# Patient Record
Sex: MALE | Race: WHITE | NOT HISPANIC OR LATINO | URBAN - METROPOLITAN AREA
[De-identification: names, ages, dates, MRNs, and addresses within clinical notes are randomized per-mention and may not be internally consistent; named-entity substitution may affect disease eponyms.]

---

## 2017-05-16 PROBLEM — Z00.00 ENCOUNTER FOR PREVENTIVE HEALTH EXAMINATION: Status: ACTIVE | Noted: 2017-05-16

## 2017-09-28 ENCOUNTER — EMERGENCY (EMERGENCY)
Facility: HOSPITAL | Age: 52
LOS: 1 days | Discharge: HOME | End: 2017-09-28
Attending: EMERGENCY MEDICINE | Admitting: EMERGENCY MEDICINE
Payer: COMMERCIAL

## 2017-09-28 VITALS
HEART RATE: 71 BPM | SYSTOLIC BLOOD PRESSURE: 163 MMHG | RESPIRATION RATE: 20 BRPM | TEMPERATURE: 98 F | OXYGEN SATURATION: 100 % | DIASTOLIC BLOOD PRESSURE: 100 MMHG

## 2017-09-28 PROCEDURE — 99284 EMERGENCY DEPT VISIT MOD MDM: CPT

## 2017-09-28 NOTE — CONSULT NOTE ADULT - SUBJECTIVE AND OBJECTIVE BOX
[facility] Ophthalmology Consult Note      CC: right eye vision change      HPI: 52 year old male, denies PMH, presented to the ED with one day of right eye vision change. Patient reports yesterday seeing floaters in his inferior view, progressing to inferior loss of vision OD. Denies any recent trauma. Denies flashes. Denies pain or redness.         PMH: Denies medical history including DM and HTN  POcHx (including surgeries/lasers):  None, reports history of cornea abrasion s/p work injury many years ago, now improved.   FamHx: Denies   Medications (systemic and drops): Denies use of medications  Allergies: PCN    ROS:  General (neg), Vision (per HPI), Head and Neck (neg), Pulm (neg), CV (neg), GI (neg),  (neg), Musculoskeletal (neg), Skin/Integ (neg), Neuro (neg), Endocrine (neg), Heme (neg), All/Immuno (neg)    Mood and Affect Appropriate ( x ),  Oriented to Time, Place, and Person x 3 ( x )    Ophthalmology Exam    Visual acuity (cc): 20/400   OD,  20/20  OS, PH: NI OU  Pupils: R&R OU, no APD  Extraocular movements (EOMs): Full OU  Confrontational Visual Field (CVF):  Inferior hemifield defect OD, Full OS  Ttono: 12    OD, 12    OS      Pen Light Exam (PLE)  External:  WNL  Lids/Lashes/Lacrimal Ducts:  WNL  Sclera/Conjunctiva:  W&Q OU  Cornea: Clear OU  Anterior Chamber: D&F OU  Iris:  Flat OU  Lens:  Clear OU    Fundus Exam  (dilated with 1% tropicamide and 2.5% phenylephrine @  7PM       )  Patient aware that pupils can remained dilated for at least 4-6 hours  Exam performed with 20D lens    ~ ~DFE PENDING DILATION.....    Vitreous:   Cup/Disc:   Macula:    Vessels:    Periphery:     A/P: Pending full dilated exam.... ~~~        Follow-Up:  Patient should follow up in the [facility] on Friday Sept 29th at 9am.   [address] [place] [id number]  [phone]       Patient reports previously following with Dr. BLANCA Galarza in [place] [phone] and may follow up with his physician if he can assure to receive care on Monday. [facility] Ophthalmology Consult Note      CC: right eye vision change      HPI: 52 year old male, denies PMH, presented to the ED with one day of right eye vision change. Patient reports yesterday seeing floaters in his inferior view, progressing to inferior loss of vision OD. Denies any recent trauma. Denies flashes. Denies pain or redness.         PMH: Denies medical history including DM and HTN  POcHx (including surgeries/lasers):  None, reports history of cornea abrasion s/p work injury many years ago, now improved.   FamHx: Denies   Medications (systemic and drops): Denies use of medications  Allergies: PCN    ROS:  General (neg), Vision (per HPI), Head and Neck (neg), Pulm (neg), CV (neg), GI (neg),  (neg), Musculoskeletal (neg), Skin/Integ (neg), Neuro (neg), Endocrine (neg), Heme (neg), All/Immuno (neg)    Mood and Affect Appropriate ( x ),  Oriented to Time, Place, and Person x 3 ( x )    Ophthalmology Exam    Visual acuity (cc): 20/800   OD,  20/20  OS, PH: NI OU  Pupils: R&R OU, no APD  Extraocular movements (EOMs): Full OU  Confrontational Visual Field (CVF):  Inferior hemifield defect OD, Full OS  Ttono: 12    OD, 12    OS      Pen Light Exam (PLE)  External:  WNL  Lids/Lashes/Lacrimal Ducts:  WNL  Sclera/Conjunctiva:  W&Q OU  Cornea: Clear OU  Anterior Chamber: D&F OU  Iris:  Flat OU  Lens:  Clear OU    Fundus Exam  (dilated with 1% tropicamide and 2.5% phenylephrine @  7PM )  Patient aware that pupils can remained dilated for at least 4-6 hours  Exam performed with 20D lens    OD: [address], no view of intact macula behind detached retina  OS: c/d 0.4, M/V/P wnl     A/P: Retinal Detachment OD, 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 BLANCA Galarza in [place] [phone] and may follow up with his physician if he can assure to receive care on Monday. [facility] Ophthalmology Consult Note      CC: right eye vision change      HPI: 52 year old male, denies PMH, presented to the ED with one day of right eye vision change. Patient reports yesterday seeing floaters in his inferior view, progressing to inferior loss of vision OD. Denies any recent trauma. Denies flashes. Denies pain or redness.         PMH: Denies medical history including DM and HTN  POcHx (including surgeries/lasers):  None, reports history of cornea abrasion s/p work injury many years ago, now improved.   FamHx: Denies   Medications (systemic and drops): Denies use of medications  Allergies: PCN    ROS:  General (neg), Vision (per HPI), Head and Neck (neg), Pulm (neg), CV (neg), GI (neg),  (neg), Musculoskeletal (neg), Skin/Integ (neg), Neuro (neg), Endocrine (neg), Heme (neg), All/Immuno (neg)    Mood and Affect Appropriate ( x ),  Oriented to Time, Place, and Person x 3 ( x )    Ophthalmology Exam    Visual acuity (cc): 20/800   OD,  20/20  OS, PH: NI OU  Pupils: R&R OU, no APD  Extraocular movements (EOMs): Full OU  Confrontational Visual Field (CVF):  Inferior hemifield defect OD, Full OS  Ttono: 12    OD, 12    OS      Pen Light Exam (PLE)  External:  WNL  Lids/Lashes/Lacrimal Ducts:  WNL  Sclera/Conjunctiva:  W&Q OU  Cornea: Clear OU  Anterior Chamber: D&F OU  Iris:  Flat OU  Lens:  Clear OU    Fundus Exam  (dilated with 1% tropicamide and 2.5% phenylephrine @  7PM )  Patient aware that pupils can remained dilated for at least 4-6 hours  Exam performed with 20D lens    OD: [address] RD, no view of intact macula behind detached retina  OS: c/d 0.4, M/V/P wnl     A/P: Retinal Detachment OD, 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

## 2017-09-29 NOTE — ASU PATIENT PROFILE, ADULT - PSH
Carpal tunnel syndrome  sx  Elective surgery  s/p hernia sx roght inguinal as a child  Elective surgery  s/p hernia surgery umbilical

## 2017-09-29 NOTE — ASU PATIENT PROFILE, ADULT - VISION (WITH CORRECTIVE LENSES IF THE PATIENT USUALLY WEARS THEM):
Right eye blurry/Partially impaired: cannot see medication labels or newsprint, but can see obstacles in path, and the surrounding layout; can count fingers at arm's length

## 2017-10-02 ENCOUNTER — OUTPATIENT (OUTPATIENT)
Dept: OUTPATIENT SERVICES | Facility: HOSPITAL | Age: 52
LOS: 1 days | End: 2017-10-02
Payer: COMMERCIAL

## 2017-10-02 ENCOUNTER — TRANSCRIPTION ENCOUNTER (OUTPATIENT)
Age: 52
End: 2017-10-02

## 2017-10-02 VITALS
SYSTOLIC BLOOD PRESSURE: 113 MMHG | DIASTOLIC BLOOD PRESSURE: 68 MMHG | HEART RATE: 74 BPM | OXYGEN SATURATION: 96 % | RESPIRATION RATE: 16 BRPM

## 2017-10-02 VITALS
HEART RATE: 78 BPM | DIASTOLIC BLOOD PRESSURE: 91 MMHG | HEIGHT: 72.5 IN | OXYGEN SATURATION: 99 % | SYSTOLIC BLOOD PRESSURE: 137 MMHG | RESPIRATION RATE: 17 BRPM | WEIGHT: 242.51 LBS | TEMPERATURE: 99 F

## 2017-10-02 DIAGNOSIS — Z41.9 ENCOUNTER FOR PROCEDURE FOR PURPOSES OTHER THAN REMEDYING HEALTH STATE, UNSPECIFIED: Chronic | ICD-10-CM

## 2017-10-02 DIAGNOSIS — H33.021 RETINAL DETACHMENT WITH MULTIPLE BREAKS, RIGHT EYE: ICD-10-CM

## 2017-10-02 DIAGNOSIS — G56.00 CARPAL TUNNEL SYNDROME, UNSPECIFIED UPPER LIMB: Chronic | ICD-10-CM

## 2017-10-02 PROCEDURE — 67108 REPAIR DETACHED RETINA: CPT | Mod: RT

## 2017-10-02 PROCEDURE — C1784: CPT

## 2017-10-02 PROCEDURE — C1889: CPT

## 2017-10-02 NOTE — ASU DISCHARGE PLAN (ADULT/PEDIATRIC). - PROCEDURE
Right eye pars plana vitrectomy, injection of perfluoron, endolaser, photocoagulation, injection of SF6 gas

## 2017-10-02 NOTE — ASU DISCHARGE PLAN (ADULT/PEDIATRIC). - SPECIAL INSTRUCTIONS
tylenol  (acetaminophen) for pain  leave on eye patch  Position face down during the day  Sleep on either side, but not on back looking up

## 2017-10-02 NOTE — ASU DISCHARGE PLAN (ADULT/PEDIATRIC). - NOTIFY
Inability to Tolerate Liquids or Foods/Swelling that continues/Increased Irritability or Sluggishness/Numbness, tingling/Bleeding that does not stop/Persistent Nausea and Vomiting/Fever greater than 101/Pain not relieved by Medications

## 2024-05-31 ENCOUNTER — APPOINTMENT (OUTPATIENT)
Dept: ORTHOPEDIC SURGERY | Facility: CLINIC | Age: 59
End: 2024-05-31
Payer: COMMERCIAL

## 2024-05-31 VITALS — BODY MASS INDEX: 32.08 KG/M2 | WEIGHT: 250 LBS | HEIGHT: 74 IN

## 2024-05-31 DIAGNOSIS — G56.02 CARPAL TUNNEL SYNDROME, LEFT UPPER LIMB: ICD-10-CM

## 2024-05-31 DIAGNOSIS — Z78.9 OTHER SPECIFIED HEALTH STATUS: ICD-10-CM

## 2024-05-31 DIAGNOSIS — I10 ESSENTIAL (PRIMARY) HYPERTENSION: ICD-10-CM

## 2024-05-31 PROCEDURE — 76942 ECHO GUIDE FOR BIOPSY: CPT

## 2024-05-31 PROCEDURE — 73110 X-RAY EXAM OF WRIST: CPT | Mod: LT

## 2024-05-31 PROCEDURE — 20526 THER INJECTION CARP TUNNEL: CPT | Mod: LT

## 2024-05-31 PROCEDURE — 99203 OFFICE O/P NEW LOW 30 MIN: CPT | Mod: 25

## 2024-05-31 RX ORDER — AMLODIPINE BESYLATE 5 MG/1
TABLET ORAL
Refills: 0 | Status: ACTIVE | COMMUNITY

## 2024-05-31 NOTE — PROCEDURE
[FreeTextEntry3] : Procedure Name: l carpal tunnel Injection: Celestone, Lidocaine, Marcaine, Evaluation and Guidance Ultrasound Injection was performed because of pain and inflammation. Anesthesia: ethyl chloride sprayed topically.  Celestone: 2 cc.  Lidocaine: 2 cc.  Marcaine: 2 cc.  Medication was injected. Patient has tried OTC's including aspirin, Ibuprofen, Aleve etc or prescription NSAIDS, and/or exercises at home and/ or physical therapy without satisfactory response. After verbal consent using sterile preparation and technique. The risks, benefits, and alternatives to cortisone injection were explained in full to the patient. Risks outlined include but are not limited to infection, sepsis, bleeding, scarring, skin discoloration, temporary increase in pain, syncopal episode, failure to resolve symptoms, allergic reaction, symptom recurrence, and elevation of blood sugar in diabetics. Patient understood the risks. All questions were answered. After discussion of options, patient requested an injection. Oral informed consent was obtained and sterile prep was done of the injection site. Sterile technique was utilized for the procedure including the preparation of the solutions used for the injection. Patient tolerated the procedure well. Advised to ice the injection site this evening. Prep with betadine locally to site. Sterile technique used.  Ultrasound guided injection was performed. Visualization of the needle and placement of injection was performed without complication. USG revealed synovitis.

## 2024-05-31 NOTE — HISTORY OF PRESENT ILLNESS
[Sudden] : sudden [8] : 8 [Tightness] : tightness [Tingling] : tingling [Constant] : constant [] : yes [Full time] : Work status: full time [FreeTextEntry1] : left hand [FreeTextEntry5] : 5/31 pain and tingling l hand neeru at night, h/o cts. [FreeTextEntry6] : numbness [FreeTextEntry9] : letting hand down  [de-identified] : grasping picking up objects  squeezing  [de-identified] : juan diego perry md [de-identified] : xrays

## 2024-05-31 NOTE — PHYSICAL EXAM
[] : positive Phalen's [Left] : left hand [No acute displaced fracture or dislocation] : No acute displaced fracture or dislocation

## 2024-06-12 ENCOUNTER — APPOINTMENT (OUTPATIENT)
Dept: NEUROLOGY | Facility: CLINIC | Age: 59
End: 2024-06-12
Payer: COMMERCIAL

## 2024-06-12 PROCEDURE — 95911 NRV CNDJ TEST 9-10 STUDIES: CPT

## 2024-06-12 PROCEDURE — 95886 MUSC TEST DONE W/N TEST COMP: CPT

## 2024-06-14 ENCOUNTER — APPOINTMENT (OUTPATIENT)
Dept: ORTHOPEDIC SURGERY | Facility: CLINIC | Age: 59
End: 2024-06-14

## 2024-07-12 ENCOUNTER — APPOINTMENT (OUTPATIENT)
Dept: ORTHOPEDIC SURGERY | Facility: CLINIC | Age: 59
End: 2024-07-12
Payer: COMMERCIAL

## 2024-07-12 VITALS — BODY MASS INDEX: 32.08 KG/M2 | WEIGHT: 250 LBS | HEIGHT: 74 IN

## 2024-07-12 DIAGNOSIS — G56.02 CARPAL TUNNEL SYNDROME, LEFT UPPER LIMB: ICD-10-CM

## 2024-07-12 PROCEDURE — 99213 OFFICE O/P EST LOW 20 MIN: CPT

## 2024-09-13 ENCOUNTER — APPOINTMENT (OUTPATIENT)
Dept: ORTHOPEDIC SURGERY | Facility: CLINIC | Age: 59
End: 2024-09-13

## 2025-05-12 ENCOUNTER — APPOINTMENT (OUTPATIENT)
Age: 60
End: 2025-05-12
